# Patient Record
Sex: FEMALE | Race: WHITE | NOT HISPANIC OR LATINO
[De-identification: names, ages, dates, MRNs, and addresses within clinical notes are randomized per-mention and may not be internally consistent; named-entity substitution may affect disease eponyms.]

---

## 2020-04-26 ENCOUNTER — MESSAGE (OUTPATIENT)
Age: 65
End: 2020-04-26

## 2021-09-23 PROBLEM — Z00.00 ENCOUNTER FOR PREVENTIVE HEALTH EXAMINATION: Status: ACTIVE | Noted: 2021-09-23

## 2021-09-24 ENCOUNTER — LABORATORY RESULT (OUTPATIENT)
Age: 66
End: 2021-09-24

## 2021-09-24 ENCOUNTER — APPOINTMENT (OUTPATIENT)
Dept: PULMONOLOGY | Facility: CLINIC | Age: 66
End: 2021-09-24
Payer: MEDICARE

## 2021-09-24 VITALS
SYSTOLIC BLOOD PRESSURE: 98 MMHG | WEIGHT: 122 LBS | BODY MASS INDEX: 23.95 KG/M2 | HEART RATE: 78 BPM | OXYGEN SATURATION: 96 % | DIASTOLIC BLOOD PRESSURE: 64 MMHG | TEMPERATURE: 98.6 F | HEIGHT: 60 IN

## 2021-09-24 DIAGNOSIS — Z87.891 PERSONAL HISTORY OF NICOTINE DEPENDENCE: ICD-10-CM

## 2021-09-24 DIAGNOSIS — Z82.49 FAMILY HISTORY OF ISCHEMIC HEART DISEASE AND OTHER DISEASES OF THE CIRCULATORY SYSTEM: ICD-10-CM

## 2021-09-24 DIAGNOSIS — R05 COUGH: ICD-10-CM

## 2021-09-24 DIAGNOSIS — Z80.9 FAMILY HISTORY OF MALIGNANT NEOPLASM, UNSPECIFIED: ICD-10-CM

## 2021-09-24 DIAGNOSIS — M81.0 AGE-RELATED OSTEOPOROSIS W/OUT CURRENT PATHOLOGICAL FRACTURE: ICD-10-CM

## 2021-09-24 DIAGNOSIS — Z78.9 OTHER SPECIFIED HEALTH STATUS: ICD-10-CM

## 2021-09-24 LAB — EPWORTH SCORE: 7

## 2021-09-24 PROCEDURE — G0008: CPT

## 2021-09-24 PROCEDURE — 99205 OFFICE O/P NEW HI 60 MIN: CPT | Mod: 25

## 2021-09-24 PROCEDURE — 90662 IIV NO PRSV INCREASED AG IM: CPT

## 2021-09-24 RX ORDER — MONTELUKAST 10 MG/1
10 TABLET, FILM COATED ORAL
Qty: 90 | Refills: 0 | Status: ACTIVE | COMMUNITY
Start: 2021-03-24

## 2021-09-24 RX ORDER — FLUTICASONE FUROATE, UMECLIDINIUM BROMIDE AND VILANTEROL TRIFENATATE 100; 62.5; 25 UG/1; UG/1; UG/1
100-62.5-25 POWDER RESPIRATORY (INHALATION)
Qty: 180 | Refills: 0 | Status: ACTIVE | COMMUNITY
Start: 2021-07-19

## 2021-09-24 RX ORDER — SUCRALFATE 1 G/1
1 TABLET ORAL
Qty: 60 | Refills: 0 | Status: ACTIVE | COMMUNITY
Start: 2021-04-09

## 2021-09-24 RX ORDER — SODIUM CHLORIDE FOR INHALATION 3 %
3 VIAL, NEBULIZER (ML) INHALATION 3 TIMES DAILY
Qty: 6 | Refills: 3 | Status: ACTIVE | COMMUNITY
Start: 2021-09-24 | End: 1900-01-01

## 2021-09-24 RX ORDER — FAMOTIDINE 40 MG/1
40 TABLET, FILM COATED ORAL
Qty: 90 | Refills: 0 | Status: ACTIVE | COMMUNITY
Start: 2021-08-16

## 2021-09-24 RX ORDER — MOMETASONE 50 UG/1
50 SPRAY, METERED NASAL
Qty: 51 | Refills: 0 | Status: ACTIVE | COMMUNITY
Start: 2021-08-28

## 2021-09-24 RX ORDER — SODIUM CHLORIDE FOR INHALATION 3.5 %
3.5 VIAL, NEBULIZER (ML) INHALATION
Qty: 240 | Refills: 0 | Status: ACTIVE | COMMUNITY
Start: 2021-05-26

## 2021-09-24 RX ORDER — SUCRALFATE 1 G/10ML
1 SUSPENSION ORAL
Qty: 480 | Refills: 0 | Status: ACTIVE | COMMUNITY
Start: 2021-03-26

## 2021-09-24 RX ORDER — CEFDINIR 300 MG/1
300 CAPSULE ORAL
Qty: 20 | Refills: 0 | Status: ACTIVE | COMMUNITY
Start: 2021-06-03

## 2021-09-24 RX ORDER — AZELASTINE HYDROCHLORIDE 137 UG/1
0.1 SPRAY, METERED NASAL
Qty: 90 | Refills: 0 | Status: ACTIVE | COMMUNITY
Start: 2021-08-31

## 2021-09-24 RX ORDER — ESOMEPRAZOLE MAGNESIUM 40 MG/1
40 CAPSULE, DELAYED RELEASE ORAL
Qty: 60 | Refills: 0 | Status: ACTIVE | COMMUNITY
Start: 2021-03-30

## 2021-09-26 LAB
25(OH)D3 SERPL-MCNC: 69.9 NG/ML
A ALTERNATA IGE QN: <0.1 KUA/L
A FUMIGATUS IGE QN: <0.1 KUA/L
ALBUMIN SERPL ELPH-MCNC: 4.4 G/DL
ALP BLD-CCNC: 83 U/L
ALT SERPL-CCNC: 12 U/L
ANION GAP SERPL CALC-SCNC: 13 MMOL/L
AST SERPL-CCNC: 19 U/L
BASOPHILS # BLD AUTO: 0.06 K/UL
BASOPHILS NFR BLD AUTO: 0.9 %
BILIRUB SERPL-MCNC: 0.2 MG/DL
BUN SERPL-MCNC: 12 MG/DL
C HERBARUM IGE QN: <0.1 KUA/L
CALCIUM SERPL-MCNC: 9.6 MG/DL
CHLORIDE SERPL-SCNC: 103 MMOL/L
CO2 SERPL-SCNC: 24 MMOL/L
CREAT SERPL-MCNC: 0.59 MG/DL
CRP SERPL-MCNC: 17 MG/L
DEPRECATED A ALTERNATA IGE RAST QL: 0
DEPRECATED A FUMIGATUS IGE RAST QL: 0
DEPRECATED C HERBARUM IGE RAST QL: 0
DEPRECATED P NOTATUM IGE RAST QL: 0
DEPRECATED S ROSTRATA IGE RAST QL: 0
ENA SS-A AB SER IA-ACNC: <0.2 AL
ENA SS-B AB SER IA-ACNC: <0.2 AL
EOSINOPHIL # BLD AUTO: 0.85 K/UL
EOSINOPHIL NFR BLD AUTO: 12.8 %
ERYTHROCYTE [SEDIMENTATION RATE] IN BLOOD BY WESTERGREN METHOD: 46 MM/HR
GLUCOSE SERPL-MCNC: 86 MG/DL
HCT VFR BLD CALC: 43.9 %
HGB BLD-MCNC: 13.7 G/DL
IMM GRANULOCYTES NFR BLD AUTO: 0.2 %
LYMPHOCYTES # BLD AUTO: 2.05 K/UL
LYMPHOCYTES NFR BLD AUTO: 31 %
MAN DIFF?: NORMAL
MCHC RBC-ENTMCNC: 29 PG
MCHC RBC-ENTMCNC: 31.2 GM/DL
MCV RBC AUTO: 93 FL
MONOCYTES # BLD AUTO: 0.55 K/UL
MONOCYTES NFR BLD AUTO: 8.3 %
NEUTROPHILS # BLD AUTO: 3.1 K/UL
NEUTROPHILS NFR BLD AUTO: 46.8 %
P NOTATUM IGE QN: <0.1 KUA/L
PLATELET # BLD AUTO: 367 K/UL
POTASSIUM SERPL-SCNC: 4.3 MMOL/L
PROT SERPL-MCNC: 7.2 G/DL
RBC # BLD: 4.72 M/UL
RBC # FLD: 14.9 %
RHEUMATOID FACT SER QL: 13 IU/ML
S ROSTRATA IGE QN: <0.1 KUA/L
SODIUM SERPL-SCNC: 139 MMOL/L
TOTAL IGE SMQN RAST: 17 KU/L
WBC # FLD AUTO: 6.62 K/UL

## 2021-09-27 LAB
ACE BLD-CCNC: 66 U/L
CCP AB SER IA-ACNC: <8 UNITS
MPO AB + PR3 PNL SER: NORMAL
RF+CCP IGG SER-IMP: NEGATIVE

## 2021-09-27 NOTE — CONSULT LETTER
[___] : [unfilled] [FreeTextEntry1] : Thank you for allowing me to consult on YFN AURIGEMMA  for  Bronchiectasis.  Please see my note below.\par   [FreeTextEntry3] : Thank you very much for allowing me to consult on your patient.  If you have any questions, please do not hesitate to contact me.\par \par Sincerely,\par \par Jules Schmidt MD\par Pulmonary and Sleep Medicine\par [facility]\par

## 2021-09-27 NOTE — HISTORY OF PRESENT ILLNESS
[Former] : former [Never] : never [TextBox_4] : I was asked to consult on this 64 y/o WW by Dr Pak for bronchiectasis and cough. \par The pt is a 64 y/o WW w/ a h/o cough that began w/ GERD 19 mos ago (February, 2020). She was taking medication but nothing was really helping and 4-6 weeks later she started to have a very dry cough. She increased her medication but her cough progressed but remained dry. She went to her PCP and had her medication changed after a CXR was WNL but her dry cough continued to progress. She saw GI andher medication was  changed again and she was told to see a pulmonologist, an allergist and ENT. Her allergist found that she was allergic to dust mites only. Her pulmonologist ordered a chest CT done and told her she had bronchiectasis. Her PFT and methacholine challenge were WNL. She was put on a nebulizer and azelastine and at that point her cough became more productive but it increased in severity to the point where she could barely speak to anyone w/o coughing or becoming SOB. She is now using a portable 4 mL nebulizer. She was wheezing in her allergist's office so he put her on Trelegy 200. When she first used Trelegy, as soon as 20 min later she coughed 50% less and that whole week her cough got much better.  She remembers when she first took Trelegy, she felt relief in her chest like "the way you should normally feel" and credits Trelegy for improving her symptoms somewhat. She is now using Trelegy 100 because Dr. Pak wanted to see if she could tolerate the lower dose but she feels like Trelegy 200 helped her more. She also uses her montelukast inhaler and her Acappella device; she believes the Acappella helps. She uses it QAM and it helps her cough up "tubular" secretions and "plugs" that are sometimes white in color and sometimes green/brown. She denies hemoptysis. She describes her cough currently as "great compared to what it was over the summer." She noticed that if she ate outside her symptoms were worse so she tried to stay inside. She is not sure if that helped much, because her symptoms were so bad over the summer that she believes she probably wouldn't have noticed small improvements. She c/o bad PND but her sinuses feel clear although she feels like she is "choking on her own secretions." She hasn’t noticed if changes in position makes her cough more but it doesn’t seem to matter if she's sitting down or sitting up. She noticed when she's standing and using the Acappella and then bends over her phlegm drains out easier. She had been using Netipot, but stopped when she felt she wasn't choking on her phlegm as much. She was also using Mucinex DM and it made her really sleepy so she switched to Mucinex D but stopped using that as well. Her ENT ordered her to be on a low acid diet in May which she did not really think was helping until the slight improvement that she has experienced recently. She still has burning sensation in her throat but no "typical heartburn feeling." However, when she eats anything, even healthier food, she feels a burning sensation in her throat and starts to cough. She does feel a tightness in her chest when she coughs but it is not a chief concern of hers as she thinks it is just an accessory of her SOB. She denies any chest tenderness. She is sleeping better than she was before but sometimes she wakes up coughing 1-2x a night. She doesn't go to the bathroom every time she wakes up; she estimates going to the bathroom 1/5 nights/ week. She is an obligate mouth breather but she does not believe she snores and denies ankle swelling. She used to be able to do P90x, but is unable to tolerate it anymore. She recalls that after her neck surgery a year prior to all her symptoms she tried to do mild exercise and had trouble breathing. She has seen improvement in her ability to walk distances- she went up from barely making it 1/4 mile to now walking 1-2 miles on flat ground. She has more YANCEY walking up hills but since starting on Trelegy she can now make it up the hill completely before stopping whereas before Trelegy she had to stop midway. She will be travelling to [place] from October through November to build a house.  [TextBox_11] : 1 [TextBox_13] : 10 [YearQuit] : 1982

## 2021-09-27 NOTE — REVIEW OF SYSTEMS
[Fatigue] : fatigue [Nasal Congestion] : nasal congestion [Sinus Problems] : sinus problems [Cough] : cough [Chest Tightness] : chest tightness [Sputum] : sputum [Wheezing] : wheezing [SOB on Exertion] : sob on exertion [Leg Cramps] : leg cramps [Seasonal Allergies] : seasonal allergies [Menopause] : menopause [Back Pain] : back pain [Negative] : Endocrine [TextBox_30] : green brown yellow clear

## 2021-09-27 NOTE — DISCUSSION/SUMMARY
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 \par CT 5/25/2021: + hyperinflation, mild BL bronchiectasis, anterior cervical hardware \par

## 2021-09-27 NOTE — ASSESSMENT
[FreeTextEntry1] : Above was discussed at length with the patient, who has an excellent understanding of the issues.

## 2021-09-27 NOTE — PHYSICAL EXAM
[No Acute Distress] : no acute distress [Normal Appearance] : normal appearance [No Neck Mass] : no neck mass [Normal Rate/Rhythm] : normal rate/rhythm [Normal S1, S2] : normal s1, s2 [No Murmurs] : no murmurs [No Resp Distress] : no resp distress [No Abnormalities] : no abnormalities [Benign] : benign [Normal Gait] : normal gait [No Clubbing] : no clubbing [No Cyanosis] : no cyanosis [No Edema] : no edema [FROM] : FROM [Normal Color/ Pigmentation] : normal color/ pigmentation [No Focal Deficits] : no focal deficits [Oriented x3] : oriented x3 [Normal Affect] : normal affect [Enlarged Base of the Tongue] : enlarged base of the tongue [Nasal congestion] : nasal congestion [III] : Mallampati Class: III [Erythema] : erythema [TextBox_11] : deviated nasal septum, crowded o/p [TextBox_68] : diffuse expiratory wheeze and rhonchi, bibasilar rales

## 2021-09-30 LAB
ANA PAT FLD IF-IMP: ABNORMAL
ANA SER IF-ACNC: ABNORMAL

## 2021-10-01 LAB
A FLAVUS AB FLD QL: NEGATIVE
A FUMIGATUS AB FLD QL: NEGATIVE
A NIGER AB FLD QL: NEGATIVE

## 2021-10-05 ENCOUNTER — APPOINTMENT (OUTPATIENT)
Dept: DISASTER EMERGENCY | Facility: CLINIC | Age: 66
End: 2021-10-05

## 2021-10-05 DIAGNOSIS — Z01.818 ENCOUNTER FOR OTHER PREPROCEDURAL EXAMINATION: ICD-10-CM

## 2021-10-06 LAB — SARS-COV-2 N GENE NPH QL NAA+PROBE: NOT DETECTED

## 2021-10-27 LAB — FUNGUS SPT CULT: NORMAL

## 2021-11-18 LAB — ACID FAST STN SPT: NORMAL

## 2021-11-19 ENCOUNTER — APPOINTMENT (OUTPATIENT)
Dept: PULMONOLOGY | Facility: CLINIC | Age: 66
End: 2021-11-19
Payer: MEDICARE

## 2021-11-19 VITALS
DIASTOLIC BLOOD PRESSURE: 50 MMHG | OXYGEN SATURATION: 100 % | HEIGHT: 60 IN | TEMPERATURE: 98.6 F | BODY MASS INDEX: 24.15 KG/M2 | WEIGHT: 123 LBS | SYSTOLIC BLOOD PRESSURE: 86 MMHG | HEART RATE: 70 BPM

## 2021-11-19 PROCEDURE — 99214 OFFICE O/P EST MOD 30 MIN: CPT

## 2021-11-19 RX ORDER — SODIUM CHLORIDE FOR INHALATION 3 %
3 VIAL, NEBULIZER (ML) INHALATION 3 TIMES DAILY
Qty: 2 | Refills: 5 | Status: ACTIVE | COMMUNITY
Start: 2021-11-19 | End: 1900-01-01

## 2021-11-22 NOTE — REVIEW OF SYSTEMS
[Postnasal Drip] : postnasal drip [Cough] : cough [Sputum] : sputum [SOB on Exertion] : sob on exertion [Negative] : Endocrine [TextBox_30] : green or white

## 2021-11-22 NOTE — DISCUSSION/SUMMARY
[FreeTextEntry1] : PFTs 10/08/2021 Actual FEV1 2.21 (FEV1 Pred 108%), FEV1/FVC(%) - 85, %, %, RV/%, DLCO 96% \par 6MWT 10/8/2021: No sig desat, but + increase in HR (78 -> 110).\par \par Labs 9/24/2021\par CBC: Wbc 6.62, Hgb 13.7, Hct 43.9, Plt 367, Eos# 0.85, Eos 12.8%\par CMP: Na 139, K 4.3, Glu 86, Bun 12, Cr 0.59, Alb 4.4, Tbili 0.2, Ast/Alt 19/12, Alp 83\par Other: Crp 17, Esr 46, RADHA +, Sjogren's -, vit D 69.9\par \par Sputum culture \par 5/29/2021: AFB -, rare penicillium species \par 5/27/2021: rare yeast\par 5/8/2021: RVP- Neg\par \par All images and report reviewed by me in the office  \par CXR 3/31/2021: Bibasilar increased interstitial markings and probable bronchiectasis. \par CT 5/25/2021: + hyperinflation, mild BL bronchiectasis, anterior cervical hardware

## 2021-11-22 NOTE — PHYSICAL EXAM
[No Acute Distress] : no acute distress [Enlarged Base of the Tongue] : enlarged base of the tongue [III] : Mallampati Class: III [Normal Appearance] : normal appearance [No Neck Mass] : no neck mass [Normal Rate/Rhythm] : normal rate/rhythm [Normal S1, S2] : normal s1, s2 [No Murmurs] : no murmurs [No Resp Distress] : no resp distress [No Abnormalities] : no abnormalities [Benign] : benign [Normal Gait] : normal gait [No Clubbing] : no clubbing [No Cyanosis] : no cyanosis [No Edema] : no edema [FROM] : FROM [Normal Color/ Pigmentation] : normal color/ pigmentation [No Focal Deficits] : no focal deficits [Oriented x3] : oriented x3 [Normal Affect] : normal affect [TextBox_11] : mild turbinate hypertrophy, cobblestone appearing o/p [TextBox_68] : + scant rhonchi and rales R anterior and posterior base > L

## 2021-11-22 NOTE — HISTORY OF PRESENT ILLNESS
[Former] : former [Never] : never [TextBox_4] : Patient returns on a f/u for bronchiectasis. She has been feeling much better, but still not what she considers "normal." She feels that the combination of Trelegy and guaifenesin has been very helpful for her.  She has also been using 3% saline nebs, Acapella QAM and the vest. She still c/o heartburn and frequent throat clearing. She has consistent phlegm in her throat that is sometimes green in color along w/ her tubular-shaped plugs that are white. She is not bringing up as many plugs as she once was. She is considering fundoplication, and I recommended she pursue it. [TextBox_11] : 1 [TextBox_13] : 10 [YearQuit] : 1982

## 2021-11-22 NOTE — CONSULT LETTER
[___] : [unfilled] [FreeTextEntry1] : Thank you for allowing me to consult on YFN AURIGEMMA  for  Bronchiectasis.  Please see my note below.\par   [FreeTextEntry3] : Thank you very much for allowing me to consult on your patient.  If you have any questions, please do not hesitate to contact me.\par \par Sincerely,\par \par Jules Schmidt MD\par Pulmonary and Sleep Medicine\par [facility]\par

## 2021-12-06 LAB — BACTERIA SPT CULT: NORMAL

## 2021-12-15 ENCOUNTER — APPOINTMENT (OUTPATIENT)
Dept: PULMONOLOGY | Facility: CLINIC | Age: 66
End: 2021-12-15
Payer: MEDICARE

## 2021-12-15 VITALS
HEIGHT: 60 IN | WEIGHT: 123 LBS | BODY MASS INDEX: 24.15 KG/M2 | SYSTOLIC BLOOD PRESSURE: 80 MMHG | HEART RATE: 99 BPM | OXYGEN SATURATION: 96 % | DIASTOLIC BLOOD PRESSURE: 56 MMHG | TEMPERATURE: 98.6 F

## 2021-12-15 PROCEDURE — 99214 OFFICE O/P EST MOD 30 MIN: CPT

## 2021-12-23 NOTE — HISTORY OF PRESENT ILLNESS
[Former] : former [Never] : never [TextBox_4] : Patient is here on a f/u for bronchiectasis. She is 1 week s/p fundoplication and recovering well. She brings up very little phlegm now, although she still feels some building up in her airways that she cannot expectorate. She feels like she is bringing up some PND in the mornings which has been harder for her to expectorate as well. She is currently using Trelegy, vest, guaifenesin, Nasonex, azelastine, and Netipot. She has been using 3% nasal saline but wants to switch to Hyper-Sal instead. The findings of her recent chest CT from 11/19 were discussed at length. [TextBox_11] : 1 [TextBox_13] : 10 [YearQuit] : 1982

## 2021-12-23 NOTE — PHYSICAL EXAM
[No Acute Distress] : no acute distress [Enlarged Base of the Tongue] : enlarged base of the tongue [III] : Mallampati Class: III [Normal Appearance] : normal appearance [No Neck Mass] : no neck mass [Normal Rate/Rhythm] : normal rate/rhythm [Normal S1, S2] : normal s1, s2 [No Murmurs] : no murmurs [No Resp Distress] : no resp distress [No Abnormalities] : no abnormalities [Benign] : benign [Normal Gait] : normal gait [No Clubbing] : no clubbing [No Cyanosis] : no cyanosis [No Edema] : no edema [FROM] : FROM [Normal Color/ Pigmentation] : normal color/ pigmentation [No Focal Deficits] : no focal deficits [Oriented x3] : oriented x3 [Normal Affect] : normal affect [Turbinate hypertrophy] : turbinate hypertrophy [TextBox_11] : dry nasal mucosa, mild cobblestone o/p [TextBox_68] : mildly diminished BS R base, no wheeze or rhonchi

## 2021-12-23 NOTE — DISCUSSION/SUMMARY
[FreeTextEntry1] : Chest CT 11/19/2021: Bronchiectasis, mucus plugging. The BLLL mucus impaction is improved, especially in the RLL. New RML atelectasis and a new 3 mm subpleural nodule. \par \par PFTs 10/08/2021 Actual FEV1 2.21 (FEV1 Pred 108%), FEV1/FVC(%) - 85, %, %, RV/%, DLCO 96% \par 6MWT 10/8/2021: No sig desat, but + increase in HR (78 -> 110).\par \par Labs 9/24/2021\par CBC: Wbc 6.62, Hgb 13.7, Hct 43.9, Plt 367, Eos# 0.85, Eos 12.8%\par CMP: Na 139, K 4.3, Glu 86, Bun 12, Cr 0.59, Alb 4.4, Tbili 0.2, Ast/Alt 19/12, Alp 83\par Other: Crp 17, Esr 46, RADHA +, Sjogren's -, vit D 69.9\par \par Sputum culture \par 5/29/2021: AFB -, rare penicillium species \par 5/27/2021: rare yeast\par 5/8/2021: RVP- Neg\par \par All images and report reviewed by me in the office  \par CXR 3/31/2021: Bibasilar increased interstitial markings and probable bronchiectasis. \par CT 5/25/2021: + hyperinflation, mild BL bronchiectasis, anterior cervical hardware

## 2022-01-06 RX ORDER — PANTOPRAZOLE 40 MG/1
40 TABLET, DELAYED RELEASE ORAL TWICE DAILY
Qty: 90 | Refills: 5 | Status: ACTIVE | COMMUNITY
Start: 2021-07-19 | End: 1900-01-01

## 2022-01-24 LAB — ACID FAST STN SPT: NORMAL

## 2022-01-31 RX ORDER — FLUTICASONE FUROATE, UMECLIDINIUM BROMIDE AND VILANTEROL TRIFENATATE 200; 62.5; 25 UG/1; UG/1; UG/1
200-62.5-25 POWDER RESPIRATORY (INHALATION)
Qty: 3 | Refills: 3 | Status: ACTIVE | COMMUNITY
Start: 2021-09-24 | End: 1900-01-01

## 2022-02-01 ENCOUNTER — RX RENEWAL (OUTPATIENT)
Age: 67
End: 2022-02-01

## 2022-03-06 ENCOUNTER — RX RENEWAL (OUTPATIENT)
Age: 67
End: 2022-03-06

## 2022-03-06 RX ORDER — SODIUM CHLORIDE FOR INHALATION 3.5 %
3.5 VIAL, NEBULIZER (ML) INHALATION TWICE DAILY
Qty: 240 | Refills: 5 | Status: ACTIVE | COMMUNITY
Start: 2021-12-15 | End: 1900-01-01

## 2022-05-23 ENCOUNTER — RESULT REVIEW (OUTPATIENT)
Age: 67
End: 2022-05-23

## 2022-06-02 RX ORDER — SODIUM CHLORIDE FOR INHALATION 3 %
3 VIAL, NEBULIZER (ML) INHALATION 3 TIMES DAILY
Qty: 2 | Refills: 5 | Status: ACTIVE | COMMUNITY
Start: 2022-06-02 | End: 1900-01-01

## 2022-06-03 ENCOUNTER — APPOINTMENT (OUTPATIENT)
Dept: PULMONOLOGY | Facility: CLINIC | Age: 67
End: 2022-06-03
Payer: MEDICARE

## 2022-06-03 VITALS
WEIGHT: 126 LBS | DIASTOLIC BLOOD PRESSURE: 70 MMHG | TEMPERATURE: 97.4 F | BODY MASS INDEX: 24.74 KG/M2 | SYSTOLIC BLOOD PRESSURE: 108 MMHG | OXYGEN SATURATION: 98 % | HEART RATE: 85 BPM | HEIGHT: 60 IN

## 2022-06-03 PROCEDURE — 99215 OFFICE O/P EST HI 40 MIN: CPT

## 2022-06-03 NOTE — REVIEW OF SYSTEMS
[Recent Wt Gain (___ Lbs)] : ~T recent [unfilled] lb weight gain [Sinus Problems] : sinus problems [Cough] : cough [Hemoptysis] : hemoptysis [Sputum] : sputum [SOB on Exertion] : sob on exertion [Negative] : Endocrine

## 2022-06-07 LAB
25(OH)D3 SERPL-MCNC: 60.9 NG/ML
ALBUMIN SERPL ELPH-MCNC: 4.4 G/DL
ALP BLD-CCNC: 80 U/L
ALT SERPL-CCNC: 17 U/L
ANION GAP SERPL CALC-SCNC: 12 MMOL/L
AST SERPL-CCNC: 21 U/L
BASOPHILS # BLD AUTO: 0.04 K/UL
BASOPHILS NFR BLD AUTO: 0.6 %
BILIRUB SERPL-MCNC: 0.3 MG/DL
BUN SERPL-MCNC: 16 MG/DL
CALCIUM SERPL-MCNC: 9.6 MG/DL
CHLORIDE SERPL-SCNC: 103 MMOL/L
CO2 SERPL-SCNC: 26 MMOL/L
CREAT SERPL-MCNC: 0.62 MG/DL
EGFR: 98 ML/MIN/1.73M2
EOSINOPHIL # BLD AUTO: 0.27 K/UL
EOSINOPHIL NFR BLD AUTO: 4 %
ERYTHROCYTE [SEDIMENTATION RATE] IN BLOOD BY WESTERGREN METHOD: 46 MM/HR
FERRITIN SERPL-MCNC: 133 NG/ML
GLUCOSE SERPL-MCNC: 96 MG/DL
HCT VFR BLD CALC: 44.5 %
HGB BLD-MCNC: 14.1 G/DL
IMM GRANULOCYTES NFR BLD AUTO: 0.1 %
IRON SATN MFR SERPL: 17 %
IRON SERPL-MCNC: 53 UG/DL
LYMPHOCYTES # BLD AUTO: 1.93 K/UL
LYMPHOCYTES NFR BLD AUTO: 28.6 %
MAGNESIUM SERPL-MCNC: 2 MG/DL
MAN DIFF?: NORMAL
MCHC RBC-ENTMCNC: 29.7 PG
MCHC RBC-ENTMCNC: 31.7 GM/DL
MCV RBC AUTO: 93.7 FL
MONOCYTES # BLD AUTO: 0.54 K/UL
MONOCYTES NFR BLD AUTO: 8 %
NEUTROPHILS # BLD AUTO: 3.97 K/UL
NEUTROPHILS NFR BLD AUTO: 58.7 %
PLATELET # BLD AUTO: 269 K/UL
POTASSIUM SERPL-SCNC: 4.5 MMOL/L
PROT SERPL-MCNC: 7 G/DL
RBC # BLD: 4.75 M/UL
RBC # FLD: 13.6 %
SODIUM SERPL-SCNC: 141 MMOL/L
TIBC SERPL-MCNC: 309 UG/DL
TOTAL IGE SMQN RAST: 25 KU/L
UIBC SERPL-MCNC: 256 UG/DL
WBC # FLD AUTO: 6.76 K/UL

## 2022-06-14 NOTE — HISTORY OF PRESENT ILLNESS
[Former] : former [Never] : never [TextBox_4] : Patient returns on a f/u for her bronchiectasis. Patient was doing great for about a month after her fundoplication but gradually started developing congestion, AM post-nasal drip and cough. Her sx got even worse while she was visiting her mother in [place] and suspected there may have been an allergen in the house that was triggering her. However, when she got home from [place] she continued to struggle. She reports an episode of hemoptysis that occurred 1x on Sunday (5/29) and did not reoccur. She saw ENT on Tuesday (5/31) and was Dx'd w/ a sinus infection. She is currently c/o constant post-nasal drip, sore throat, ear fullness and general head congestion. She also reports cough and feels like there is always something stuck in her throat. Her phlegm is green in color and feels like it is originating from somewhere other than her lungs. She denies SOB, CP, pressure or tightness. She felt good for 1 day after taking 3 doses of Augmentin but has gone back to feeling poor despite still being on it. Her phlegm has also stayed green despite the Augmentin. She has been taking Zyrtec QDay but feels it is not doing much. She also uses nasal saline QAM, HyperSal, and the vest on mornings when she feels especially congested. She feels as though none of these things are really helping her that much. She also reports being kept awake at night w/ painful cramping in her legs, shins, feet and toes. She notices the cramps occur especially after she stretches at night. Her fingers have also started cramping during the day. She has a flight to [place] tomorrow and will be there for the next 4 weeks.  [TextBox_11] : 1 [TextBox_13] : 10 [YearQuit] : 1982

## 2022-06-14 NOTE — PHYSICAL EXAM
[No Acute Distress] : no acute distress [Enlarged Base of the Tongue] : enlarged base of the tongue [III] : Mallampati Class: III [Normal Appearance] : normal appearance [No Neck Mass] : no neck mass [Normal Rate/Rhythm] : normal rate/rhythm [Normal S1, S2] : normal s1, s2 [No Murmurs] : no murmurs [No Resp Distress] : no resp distress [No Abnormalities] : no abnormalities [Benign] : benign [Normal Gait] : normal gait [No Clubbing] : no clubbing [No Cyanosis] : no cyanosis [No Edema] : no edema [FROM] : FROM [Normal Color/ Pigmentation] : normal color/ pigmentation [No Focal Deficits] : no focal deficits [Oriented x3] : oriented x3 [Normal Affect] : normal affect [Nasal congestion] : nasal congestion [TextBox_11] : + BL turbinate hypertrophy, mucus, crowded cobblestone o/p [TextBox_68] : scant dry basilar rales, no overt expiratory wheeze or rhonchi

## 2022-06-14 NOTE — DISCUSSION/SUMMARY
[FreeTextEntry1] : CT 5/23/2022: Mild COPD pattern. Bronchiectasis BLLLs and RML. No acute infiltrates were seen. Generally improved compared to CT 5/25/2021.\par \par Chest CT 11/19/2021: Bronchiectasis, mucus plugging. The BLLL mucus impaction is improved, especially in the RLL. New RML atelectasis and a new 3 mm subpleural nodule. \par \par PFTs 10/08/2021 Actual FEV1 2.21 (FEV1 Pred 108%), FEV1/FVC(%) - 85, %, %, RV/%, DLCO 96% \par 6MWT 10/8/2021: No sig desat, but + increase in HR (78 -> 110).\par \par Labs 9/24/2021\par CBC: Wbc 6.62, Hgb 13.7, Hct 43.9, Plt 367, Eos# 0.85, Eos 12.8%\par CMP: Na 139, K 4.3, Glu 86, Bun 12, Cr 0.59, Alb 4.4, Tbili 0.2, Ast/Alt 19/12, Alp 83\par Other: Crp 17, Esr 46, RADHA +, Sjogren's -, vit D 69.9\par \par Sputum culture \par 5/29/2021: AFB -, rare penicillium species \par 5/27/2021: rare yeast\par 5/8/2021: RVP- Neg\par \par All images and report reviewed by me in the office  \par CXR 3/31/2021: Bibasilar increased interstitial markings and probable bronchiectasis. \par CT 5/25/2021: + hyperinflation, mild BL bronchiectasis, anterior cervical hardware

## 2022-06-29 ENCOUNTER — RX CHANGE (OUTPATIENT)
Age: 67
End: 2022-06-29

## 2022-06-29 RX ORDER — BUDESONIDE 0.5 MG/2ML
0.5 INHALANT ORAL TWICE DAILY
Qty: 120 | Refills: 5 | Status: DISCONTINUED | COMMUNITY
Start: 2022-06-03 | End: 2022-06-29

## 2022-07-18 ENCOUNTER — APPOINTMENT (OUTPATIENT)
Dept: PULMONOLOGY | Facility: CLINIC | Age: 67
End: 2022-07-18

## 2022-07-18 VITALS
BODY MASS INDEX: 24.74 KG/M2 | WEIGHT: 126 LBS | OXYGEN SATURATION: 98 % | DIASTOLIC BLOOD PRESSURE: 66 MMHG | HEIGHT: 60 IN | TEMPERATURE: 98.6 F | SYSTOLIC BLOOD PRESSURE: 104 MMHG | HEART RATE: 87 BPM

## 2022-07-18 DIAGNOSIS — J01.11 ACUTE RECURRENT FRONTAL SINUSITIS: ICD-10-CM

## 2022-07-18 DIAGNOSIS — J34.2 DEVIATED NASAL SEPTUM: ICD-10-CM

## 2022-07-18 DIAGNOSIS — E61.1 IRON DEFICIENCY: ICD-10-CM

## 2022-07-18 DIAGNOSIS — K21.9 GASTRO-ESOPHAGEAL REFLUX DISEASE W/OUT ESOPHAGITIS: ICD-10-CM

## 2022-07-18 DIAGNOSIS — R93.89 ABNORMAL FINDINGS ON DIAGNOSTIC IMAGING OF OTHER SPECIFIED BODY STRUCTURES: ICD-10-CM

## 2022-07-18 DIAGNOSIS — G25.81 RESTLESS LEGS SYNDROME: ICD-10-CM

## 2022-07-18 DIAGNOSIS — J44.9 CHRONIC OBSTRUCTIVE PULMONARY DISEASE, UNSPECIFIED: ICD-10-CM

## 2022-07-18 DIAGNOSIS — E55.9 VITAMIN D DEFICIENCY, UNSPECIFIED: ICD-10-CM

## 2022-07-18 DIAGNOSIS — A31.0 PULMONARY MYCOBACTERIAL INFECTION: ICD-10-CM

## 2022-07-18 DIAGNOSIS — J47.0 BRONCHIECTASIS WITH ACUTE LOWER RESPIRATORY INFECTION: ICD-10-CM

## 2022-07-18 PROCEDURE — 99214 OFFICE O/P EST MOD 30 MIN: CPT

## 2022-07-18 RX ORDER — CLOTRIMAZOLE AND BETAMETHASONE DIPROPIONATE 10; .5 MG/G; MG/G
1-0.05 CREAM TOPICAL
Qty: 15 | Refills: 0 | Status: ACTIVE | COMMUNITY
Start: 2021-12-15

## 2022-07-18 RX ORDER — AMOXICILLIN AND CLAVULANATE POTASSIUM 875; 125 MG/1; MG/1
875-125 TABLET, COATED ORAL
Qty: 20 | Refills: 0 | Status: ACTIVE | COMMUNITY
Start: 2022-05-31

## 2022-07-18 RX ORDER — BETAMETHASONE DIPROPIONATE 0.5 MG/G
0.05 OINTMENT, AUGMENTED TOPICAL
Qty: 50 | Refills: 0 | Status: ACTIVE | COMMUNITY
Start: 2022-06-04

## 2022-07-18 NOTE — REVIEW OF SYSTEMS
[Cough] : cough [Chest Tightness] : chest tightness [Wheezing] : wheezing [Seasonal Allergies] : seasonal allergies [Negative] : Endocrine

## 2022-07-19 PROBLEM — J34.2 DEVIATED NASAL SEPTUM: Status: ACTIVE | Noted: 2021-09-24

## 2022-07-19 PROBLEM — J01.11 ACUTE RECURRENT FRONTAL SINUSITIS: Status: ACTIVE | Noted: 2022-06-03

## 2022-07-19 PROBLEM — R93.89 ABNORMAL CHEST CT: Status: ACTIVE | Noted: 2021-09-24

## 2022-07-19 PROBLEM — J47.0 BRONCHIECTASIS WITH ACUTE LOWER RESPIRATORY INFECTION: Status: ACTIVE | Noted: 2021-09-24

## 2022-07-19 PROBLEM — G25.81 RESTLESS LEGS SYNDROME (RLS): Status: ACTIVE | Noted: 2022-06-03

## 2022-07-19 NOTE — PHYSICAL EXAM
[No Acute Distress] : no acute distress [Enlarged Base of the Tongue] : enlarged base of the tongue [III] : Mallampati Class: III [Normal Appearance] : normal appearance [No Neck Mass] : no neck mass [Normal Rate/Rhythm] : normal rate/rhythm [Normal S1, S2] : normal s1, s2 [No Murmurs] : no murmurs [No Resp Distress] : no resp distress [No Abnormalities] : no abnormalities [Benign] : benign [Normal Gait] : normal gait [No Clubbing] : no clubbing [No Cyanosis] : no cyanosis [No Edema] : no edema [FROM] : FROM [Normal Color/ Pigmentation] : normal color/ pigmentation [No Focal Deficits] : no focal deficits [Oriented x3] : oriented x3 [Normal Affect] : normal affect [Turbinate hypertrophy] : turbinate hypertrophy [TextBox_11] : severe deviated nasal septum, near complete obstruction L naris, worsening turbinate hypertrophy on the R [TextBox_68] : scattered basilar rales, scattered expiratory wheeze and rhonchi

## 2022-07-19 NOTE — HISTORY OF PRESENT ILLNESS
[Former] : former [Never] : never [TextBox_4] : Patient returns on a f/u for bronchiectasis. She feels like today is not a "bad day" but she is still not doing well. She is still coughing and still c/o globus sensation which triggers the cough. She is bringing up yellow phlegm in the morning and green "pieces" of phlegm throughout the day. She sometimes feels like a "fire lit" her throat. She also reports ant post-nasal drip and feels like a lot of her congestion is coming from her nasal passages. She finds the Netipot to be very effective at clearing out her nasal passages and she is able to flush out a lot of phlegm especially in the morning. She also feels like budesonide nebulizer has been great for her and has helped her significantly. She used to cough in the middle of the night but has not been lately. She is waking up less in the middle of the night in general, now down from 6-7x/night to 2-3x. She still feels tired during the day. Her middle of the night cramping is still present but has gotten better since she started on CALM supplementation. \par  [TextBox_11] : 1 [TextBox_13] : 10 [YearQuit] : 1982

## 2022-07-19 NOTE — DISCUSSION/SUMMARY
[FreeTextEntry1] : All images and report reviewed by me in the office \par CT 5/23/2022: Mild COPD pattern. Bronchiectasis BLLs and RML. No acute infiltrates were seen. Generally improved compared to CT 5/25/2021.\par \par Chest CT 11/19/2021: Bronchiectasis, mucus plugging. The BLLL mucus impaction is improved, especially in the RLL. New RML atelectasis and a new 3 mm subpleural nodule. \par \par PFTs 10/08/2021 Actual FEV1 2.21 (FEV1 Pred 108%), FEV1/FVC(%) - 85, %, %, RV/%, DLCO 96% \par 6MWT 10/8/2021: No sig desat, but + increase in HR (78 -> 110).\par \par Labs 9/24/2021\par CBC: Wbc 6.62, Hgb 13.7, Hct 43.9, Plt 367, Eos# 0.85, Eos 12.8%\par CMP: Na 139, K 4.3, Glu 86, Bun 12, Cr 0.59, Alb 4.4, Tbili 0.2, Ast/Alt 19/12, Alp 83\par Other: Crp 17, Esr 46, ARDHA +, Sjogren's -, vit D 69.9\par \par Sputum culture \par 5/29/2021: AFB -, rare penicillium species \par 5/27/2021: rare yeast\par 5/8/2021: RVP- Neg\par \par All images and report reviewed by me in the office  \par CXR 3/31/2021: Bibasilar increased interstitial markings and probable bronchiectasis. \par CT 5/25/2021: + hyperinflation, mild BL bronchiectasis, anterior cervical hardware

## 2022-08-17 RX ORDER — GABAPENTIN ENACARBIL 300 MG/1
300 TABLET, EXTENDED RELEASE ORAL
Qty: 90 | Refills: 3 | Status: ACTIVE | COMMUNITY
Start: 2022-07-18 | End: 1900-01-01

## 2022-10-25 ENCOUNTER — RX RENEWAL (OUTPATIENT)
Age: 67
End: 2022-10-25

## 2022-10-25 RX ORDER — FLUTICASONE FUROATE, UMECLIDINIUM BROMIDE AND VILANTEROL TRIFENATATE 200; 62.5; 25 UG/1; UG/1; UG/1
200-62.5-25 POWDER RESPIRATORY (INHALATION)
Qty: 1 | Refills: 5 | Status: ACTIVE | COMMUNITY
Start: 2022-10-25 | End: 1900-01-01

## 2022-12-22 ENCOUNTER — RX RENEWAL (OUTPATIENT)
Age: 67
End: 2022-12-22

## 2022-12-22 RX ORDER — BUDESONIDE 0.5 MG/2ML
0.5 INHALANT ORAL TWICE DAILY
Qty: 360 | Refills: 3 | Status: ACTIVE | COMMUNITY
Start: 2022-06-29 | End: 1900-01-01

## 2022-12-27 ENCOUNTER — APPOINTMENT (OUTPATIENT)
Dept: PULMONOLOGY | Facility: CLINIC | Age: 67
End: 2022-12-27